# Patient Record
Sex: FEMALE | Race: WHITE | Employment: FULL TIME | URBAN - NONMETROPOLITAN AREA
[De-identification: names, ages, dates, MRNs, and addresses within clinical notes are randomized per-mention and may not be internally consistent; named-entity substitution may affect disease eponyms.]

---

## 2021-02-04 ENCOUNTER — OFFICE VISIT (OUTPATIENT)
Dept: OBGYN CLINIC | Age: 51
End: 2021-02-04
Payer: COMMERCIAL

## 2021-02-04 VITALS
DIASTOLIC BLOOD PRESSURE: 74 MMHG | WEIGHT: 237.2 LBS | SYSTOLIC BLOOD PRESSURE: 124 MMHG | HEIGHT: 64 IN | BODY MASS INDEX: 40.49 KG/M2

## 2021-02-04 DIAGNOSIS — F32.81 PMDD (PREMENSTRUAL DYSPHORIC DISORDER): ICD-10-CM

## 2021-02-04 DIAGNOSIS — N94.6 DYSMENORRHEA: Primary | ICD-10-CM

## 2021-02-04 PROCEDURE — 99213 OFFICE O/P EST LOW 20 MIN: CPT | Performed by: ADVANCED PRACTICE MIDWIFE

## 2021-02-04 ASSESSMENT — ENCOUNTER SYMPTOMS
NAUSEA: 1
DIARRHEA: 1
RESPIRATORY NEGATIVE: 1

## 2021-02-04 NOTE — PROGRESS NOTES
PROBLEM VISIT     Date of service: 2021    Lilia Horn  Is a 52 y.o.  female    PT's PCP is: Radha Escobedo MD     : 1970                                          Review of Systems   Constitutional: Negative. HENT: Negative. Respiratory: Negative. Cardiovascular: Negative. Gastrointestinal: Positive for diarrhea (with menses) and nausea (with menses). Endocrine: Negative. Genitourinary: Positive for menstrual problem (very painful heavy cycles). Negative for dyspareunia. Musculoskeletal: Positive for myalgias (with menses ). Patient's last menstrual period was 2021 (exact date). OB History    Para Term  AB Living   2             SAB TAB Ectopic Molar Multiple Live Births             2      # Outcome Date GA Lbr Leodan/2nd Weight Sex Delivery Anes PTL Lv   2             1                  Social History     Tobacco Use   Smoking Status Never Smoker   Smokeless Tobacco Never Used        Social History     Substance and Sexual Activity   Alcohol Use Yes    Alcohol/week: 1.0 standard drinks    Types: 1 Glasses of wine per week       Allergies: Patient has no known allergies. No current outpatient medications on file. Social History     Substance and Sexual Activity   Sexual Activity Not Currently       Chief Complaint   Patient presents with    Pelvic Pain     Pt c/o bad pelvic pain during cycles that includes nausea/vominting, diarrhea. Physical Exam  Constitutional:       Appearance: Normal appearance. She is obese. Genitourinary:      Pelvic exam was performed with patient in the lithotomy position. Vulva, urethra, bladder, vagina, cervix, right adnexa and left adnexa normal.      Uterus is enlarged (mild enlargement). Uterus is anteverted. HENT:      Head: Normocephalic. Eyes:      Pupils: Pupils are equal, round, and reactive to light. Neck:      Musculoskeletal: Normal range of motion. Pulmonary:      Effort: Pulmonary effort is normal.   Abdominal:      Palpations: Abdomen is soft. Musculoskeletal: Normal range of motion. Neurological:      Mental Status: She is alert and oriented to person, place, and time. Skin:     General: Skin is warm and dry. Psychiatric:         Behavior: Behavior normal.   Vitals signs and nursing note reviewed. Chaperone present: Declined. HPI: Reports that menses are very painful in addition to pelvic pain has generalized body aches for few days of cycle. Also nausea and diarrhea. Reports menses are regular with first 3 days of bleeding being spotty then 2 days of heavy bleeding then tapers off. Returns to [facility] in June for wellcheck and [employer] bx in addition to PFT's                          Assessment and Plan          Diagnosis Orders   1. Dysmenorrhea     2. PMDD (premenstrual dysphoric disorder)       Discussed possible causes for menses changes including but not limited to thyroid dysfunction (denies all s/s of this), adenomyosis, fibroids. Patient agreeable to USN and then f/u after to discuss options. Briefly mentioned LARC IUD, ablation (spouse has had vasectomy so prefers no t/l as \"not getting pregnant ever again\"), hyst.  Patient leaning toward hyst.         Chandrakant Beard does not currently have medications on file. Return in about 1 week (around 2/11/2021) for Pelvic USN, Gyn f/u. She was also counseled on her preventative health maintenance recommendations and follow-up. There are no Patient Instructions on file for this visit.     [address] JANES,2/4/2021 11:53 AM

## 2021-02-08 ENCOUNTER — OFFICE VISIT (OUTPATIENT)
Dept: OBGYN CLINIC | Age: 51
End: 2021-02-08
Payer: COMMERCIAL

## 2021-02-08 ENCOUNTER — TELEPHONE (OUTPATIENT)
Dept: OBGYN CLINIC | Age: 51
End: 2021-02-08

## 2021-02-08 VITALS
DIASTOLIC BLOOD PRESSURE: 72 MMHG | WEIGHT: 240.8 LBS | BODY MASS INDEX: 41.11 KG/M2 | HEIGHT: 64 IN | SYSTOLIC BLOOD PRESSURE: 110 MMHG

## 2021-02-08 DIAGNOSIS — N85.2 ENLARGED UTERUS: ICD-10-CM

## 2021-02-08 DIAGNOSIS — N92.0 MENORRHAGIA WITH REGULAR CYCLE: Primary | ICD-10-CM

## 2021-02-08 PROCEDURE — 99213 OFFICE O/P EST LOW 20 MIN: CPT | Performed by: ADVANCED PRACTICE MIDWIFE

## 2021-02-08 ASSESSMENT — ENCOUNTER SYMPTOMS
NAUSEA: 1
RESPIRATORY NEGATIVE: 1
DIARRHEA: 1

## 2021-02-08 NOTE — PROGRESS NOTES
PROBLEM VISIT     Date of service: 2021    Jaylon Greenberg  Is a 52 y.o.  female    PT's PCP is: Ekta Razo MD     : 1970                                          Review of Systems   Constitutional: Negative. Respiratory: Negative. Cardiovascular: Negative. Gastrointestinal: Positive for diarrhea and nausea. Genitourinary: Positive for menstrual problem. No significant changes since last visit. S/S all reviewed last visit and occur during menses    Patient's last menstrual period was 2021 (exact date). OB History    Para Term  AB Living   2             SAB TAB Ectopic Molar Multiple Live Births             2      # Outcome Date GA Lbr Leodan/2nd Weight Sex Delivery Anes PTL Lv   2             1                  Social History     Tobacco Use   Smoking Status Never Smoker   Smokeless Tobacco Never Used        Social History     Substance and Sexual Activity   Alcohol Use Yes    Alcohol/week: 1.0 standard drinks    Types: 1 Glasses of wine per week       Allergies: Patient has no known allergies. No current outpatient medications on file. Social History     Substance and Sexual Activity   Sexual Activity Not Currently       Chief Complaint   Patient presents with    Dysmenorrhea     Pt here for USN f/u. Pt denies any new concerns. Physical Exam  Constitutional:       Appearance: Normal appearance. She is obese. HENT:      Head: Normocephalic. Eyes:      Pupils: Pupils are equal, round, and reactive to light. Neck:      Musculoskeletal: Normal range of motion. Pulmonary:      Effort: Pulmonary effort is normal.   Musculoskeletal: Normal range of motion. Neurological:      Mental Status: She is alert and oriented to person, place, and time. Skin:     General: Skin is warm and dry. Psychiatric:         Behavior: Behavior normal.   Vitals signs and nursing note reviewed.          Vital Signs Blood pressure 110/72, height 5' 4\" (1.626 m), weight 240 lb 12.8 oz (109.2 kg), last menstrual period 01/24/2021. HPI Patient here to review USN which was done for heavy painful menses - has other s/s during menses that are more systemic (GI) and also body aches. HX of anemia. Has umbilical hernia repair with mesh placement                      Results reviewed today:    USN today shows enlarged uterus (this was also palpated last exam) 10.6x5.8x5.0 cm  Endometrium = 11mm (currently cycle day 16)  Ovaries appear normal  Follicles bilaterally with largest 1.9cm                              Assessment and Plan          Diagnosis Orders   1. Menorrhagia with regular cycle     2. Enlarged uterus         Discussed findings with patient in detail. Discussed options for menses mgmt. Discussed that novasure recommends uterus <10cm for ablation. Discussed that IUD is not a guarantee for menses suppression and that often by 6 months women do have improvement in bleeding but cannot guarantee and may have irregular bleeding leading up to that time. Reviewed hyst options - patient concerned about hx of mesh at umbilicus and also anemia. Patient was leaning toward hyst but would like to review with Dr Ray Lane at annual exam again. [facility] Monroe does not currently have medications on file. Return Annual with Dr Ray Lane. She was also counseled on her preventative health maintenance recommendations and follow-up. There are no Patient Instructions on file for this visit.     Sheila MARRERO,2/8/2021 10:10 AM                   Electronically signed by NELA Cline CNM

## 2021-02-08 NOTE — TELEPHONE ENCOUNTER
Dr Jessica Godinez - wanted to let you know about this sweet patient. She cam in 2/4 to discuss her cycles. Still regular but having GI changes in addition to body aches with menses. Cycles are very heavy and painful. On exam I did note a slightly enlarged uterus. She was then scheduled for USN. We touched on options at this visit for mgmt - ablation, IUD, hyst and she was leaning toward hyst.    USN today shows 10.6cm uterus, endometrium of 11mm (currently mid cycle). We reviewed options again - discussed that novasure recommends uterine size less than or equal to 10mm. Discussed that cannot guarantee menses suppression with IUD and that may have irregular bleeding for first 4-6 months, also may not resolve pain if unrelated to menses. Discussed hyst - has umbilical mesh from hernia repair done by Dr Juan Burleson and long hx of anemia. She wanted to review hyst in detail more with you and concerns about mesh placement etc.  She was scheduled for annual with MT but moved appt to you to review this in detail again.       Thanks

## 2021-03-03 ENCOUNTER — HOSPITAL ENCOUNTER (OUTPATIENT)
Age: 51
Setting detail: SPECIMEN
Discharge: HOME | End: 2021-03-03
Payer: COMMERCIAL

## 2021-03-03 ENCOUNTER — OFFICE VISIT (OUTPATIENT)
Dept: OBGYN CLINIC | Age: 51
End: 2021-03-03
Payer: COMMERCIAL

## 2021-03-03 VITALS
DIASTOLIC BLOOD PRESSURE: 64 MMHG | SYSTOLIC BLOOD PRESSURE: 100 MMHG | HEIGHT: 63 IN | WEIGHT: 241 LBS | BODY MASS INDEX: 42.7 KG/M2

## 2021-03-03 DIAGNOSIS — N85.2 ENLARGED UTERUS: ICD-10-CM

## 2021-03-03 DIAGNOSIS — Z01.419 WOMEN'S ANNUAL ROUTINE GYNECOLOGICAL EXAMINATION: ICD-10-CM

## 2021-03-03 DIAGNOSIS — N92.0 MENORRHAGIA WITH REGULAR CYCLE: Primary | ICD-10-CM

## 2021-03-03 PROCEDURE — 99396 PREV VISIT EST AGE 40-64: CPT | Performed by: OBSTETRICS & GYNECOLOGY

## 2021-03-03 RX ORDER — FLUCONAZOLE 150 MG/1
150 TABLET ORAL
Qty: 2 TABLET | Refills: 2 | Status: SHIPPED | OUTPATIENT
Start: 2021-03-03 | End: 2021-03-09

## 2021-03-03 NOTE — PROGRESS NOTES
DATE OF VISIT:  3/3/21  PATIENT NAME:  Blanquita Weems     YOB: 1970    52 y.o. Chief Complaint   Patient presents with    Annual Exam     Pt. needs pap. Pt. is overdue for mamm.  Pelvic Pain     Pt. reports severe pain with her period. With her period in . she was in such severe pain that she was on the floor, sweating,  feeling as if she was going to vomit.  Menorrhagia     Pt. reports heavy bleeding on 3rd day of her period. Has to wear pad and a tampon. 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 Patient's last menstrual period was 2021. Primary Care Physician: Juan F Bustamante MD    The patient was seen and examined. She has no chiefcomplaint today and is here for her annual exam.  Her bowels are regular. There are no voiding complaints. She denies any bloating. She denies vaginal discharge and was counseled on STD's and the need for barriercontraception.      HPI : Blanquita Weems is a 52 y.o. female  who presents today for her annual.    ______________________________________________________________________    OB History    Para Term  AB Living   2 2           SAB TAB Ectopic Molar Multiple Live Births             2      # Outcome Date GA Lbr Leodan/2nd Weight Sex Delivery Anes PTL Lv   2 Para            1 Para              Past Medical History:   Diagnosis Date    Abnormal Pap smear of cervix     , 2016    Hyperlipidemia     Hypertension     Liver disease     Stage 3 cirrhosis of liver, spelenomegaly, hx sarcoidosis    Migraines     Polycystic ovaries                                                                    Past Surgical History:   Procedure Laterality Date    CHOLECYSTECTOMY  2010    HERNIA REPAIR      2019    LIVER BIOPSY  2016     Family History   Problem Relation Age of Onset    Lung Cancer Paternal Grandfather     Diabetes Paternal Grandfather     Diabetes Paternal Grandmother     Hypertension Father      Social History     Socioeconomic History    Marital status:      Spouse name: Not on file    Number of children: Not on file    Years of education: Not on file    Highest education level: Not on file   Occupational History    Not on file   Social Needs    Financial resource strain: Not on file    Food insecurity     Worry: Not on file     Inability: Not on file    Transportation needs     Medical: Not on file     Non-medical: Not on file   Tobacco Use    Smoking status: Never Smoker    Smokeless tobacco: Never Used   Substance and Sexual Activity    Alcohol use: Yes     Alcohol/week: 1.0 standard drinks     Types: 1 Glasses of wine per week    Drug use: Never    Sexual activity: Not Currently   Lifestyle    Physical activity     Days per week: Not on file     Minutes per session: Not on file    Stress: Not on file   Relationships    Social connections     Talks on phone: Not on file     Gets together: Not on file     Attends [employer] service: Not on file     Active member of club or organization: Not on file     Attends meetings of clubs or organizations: Not on file     Relationship status: Not on file    Intimate partner violence     Fear of current or ex partner: Not on file     Emotionally abused: Not on file     Physically abused: Not on file     Forced sexual activity: Not on file   Other Topics Concern    Not on file   Social History Narrative    Not on file     Vitals:    03/03/21 0753   BP: 100/64   Site: Right Upper Arm   Position: Sitting   Weight: 241 lb (109.3 kg)   Height: 5' 3\" (1.6 m)     Body mass index is 42.69 kg/m². Patient's last menstrual period was 02/21/2021. MEDICATIONS:  Current Outpatient Medications   Medication Sig Dispense Refill    fluconazole (DIFLUCAN) 150 MG tablet Take 1 tablet by mouth every 72 hours for 6 days 2 tablet 2     No current facility-administered medications for this visit.         ALLERGIES: Allergies as of 03/03/2021    (No Known Allergies)           Symptoms of decreased mood absent    **If either question is answered in a  positive fashion then completethe PHQ9 Scoring Evaluation and make the appropriate referral**      Gynecologic History:     Patient's last menstrual period was 02/21/2021. Sexually Active: yes    STD History: No     Permanent Sterilization:No   Reversible Birth Control: No        Hormone Replacement Exposure: No      Genetic Qualified Family History of Breast, Ovarian , Colon or Uterine Cancer:No   If YES see scanned worksheet. Preventative Health Testing:  Colposcopy History:   Date of Last Mammogram: due - will do at [address]  Date of Last Colonoscopy:   Date of Last Bone Density:      ______________________________________________________________________    REVIEW OF SYSTEMS:       Review of Systems   Constitutional: Negative for chills and fever. Genitourinary: Positive for menstrual problem and pelvic pain. Negative for dysuria and vaginal discharge. PHYSICAL EXAM:    Physical Exam  Constitutional:       Appearance: Normal appearance. Genitourinary:      Pelvic exam was performed with patient in the lithotomy position. Vulva, vagina, cervix, uterus, right adnexa and left adnexa normal.   HENT:      Head: Atraumatic. Mouth/Throat:      Mouth: Mucous membranes are moist.   Eyes:      Extraocular Movements: Extraocular movements intact. Pupils: Pupils are equal, round, and reactive to light. Neck:      Musculoskeletal: Normal range of motion. Cardiovascular:      Rate and Rhythm: Normal rate. Pulmonary:      Effort: Pulmonary effort is normal.   Chest:      Breasts:         Right: Normal.         Left: Normal.   Abdominal:      General: There is no distension. Palpations: Abdomen is soft. Tenderness: There is no abdominal tenderness. Musculoskeletal: Normal range of motion.    Neurological:      Mental Status: She is alert and oriented to person, place, and time. Skin:     General: Skin is warm and dry. Psychiatric:         Mood and Affect: Mood normal.         Behavior: Behavior normal.         Thought Content: Thought content normal.         Judgment: Judgment normal.   Chaperone present: chaperone declined. POC Cultures:  No results found for this visit on 03/03/21. ASSESSMENT:      52 y.o. Annual  1. Menorrhagia with regular cycle    2. Enlarged uterus    3. Women's annual routine gynecological examination      Pt to consider treatment options for menorrhagia - considering hysteroscopy d&c novasure v tlh    There are no active problems to display for this patient. Hereditary Breast, Ovarian, Colon and Uterine Cancer screening Done. Tobacco & Secondary smoke risks reviewed; instructed on cessation and avoidance    PLAN:    Return in about 1 year (around 3/3/2022) for annual.  Orders Placed This Encounter   Procedures    PAP SMEAR       Repeat Annual every 1 year  Cervical Cytology Evaluation begins at 24years old. If Negative Cytology, Follow-up screening per current guidelines. Mammograms every 1year. If 37 yo and last mammogram was negative. Calcium and Vitamin D dosing reviewed. Colonoscopy screening reviewed as well as onset for bone density testing. Birth control and barrier recommendationsdiscussed. STD counseling and prevention reviewed. Gardisil counseling completed for all patients 7-33 yo. Routine healthmaintenance per patients PCP.     Electronicallysigned by:  Anthony Madden DO on 03/03/21

## 2021-03-05 LAB
HPV SAMPLE: NORMAL
HPV, GENOTYPE 16: NOT DETECTED
HPV, GENOTYPE 18: NOT DETECTED
HPV, HIGH RISK OTHER: NOT DETECTED
HPV, INTERPRETATION: NORMAL
SPECIMEN DESCRIPTION: NORMAL

## 2021-03-11 LAB — CYTOLOGY REPORT: NORMAL

## 2021-03-15 RX ORDER — FLUCONAZOLE 150 MG/1
150 TABLET ORAL ONCE
Qty: 1 TABLET | Refills: 0 | Status: SHIPPED | OUTPATIENT
Start: 2021-03-15 | End: 2021-03-15

## 2021-03-15 NOTE — RESULT ENCOUNTER NOTE
Left a message on patient's voicemail of her results and recommendations. Rx for Diflucan e-prescribed. She is to call back with any further questions/concerns.

## 2021-06-02 ENCOUNTER — TELEPHONE (OUTPATIENT)
Dept: OBGYN CLINIC | Age: 51
End: 2021-06-02

## 2021-06-02 NOTE — TELEPHONE ENCOUNTER
Patient had left a message on the voicemail that she wanted to schedule a hyst.  Attempted to call patient to schedule her procedure and had to leave a message. Left details regarding surgery expectations and recovery. Left available dates also. She will look at her calendar and call back with her decision.

## 2021-07-07 DIAGNOSIS — I10 ESSENTIAL HYPERTENSION, BENIGN: Primary | ICD-10-CM

## 2021-07-07 DIAGNOSIS — N94.6 DYSMENORRHEA: ICD-10-CM

## 2021-07-07 DIAGNOSIS — N92.0 MENORRHAGIA WITH REGULAR CYCLE: ICD-10-CM

## 2021-07-07 DIAGNOSIS — Z01.818 PRE-OP TESTING: ICD-10-CM

## 2021-07-07 DIAGNOSIS — N85.2 ENLARGED UTERUS: ICD-10-CM

## 2021-07-07 DIAGNOSIS — R10.2 PELVIC PAIN IN FEMALE: ICD-10-CM

## 2021-07-07 NOTE — TELEPHONE ENCOUNTER
Patient had left a message on the voicemail that she would like to go ahead with 11/15. I attempted to call patient to confirm surgery details and had to leave a message. She is scheduled for a [employer], shady HENDERSON, shady Mendes at [facility] on 11/15. She is aware that a nurse from [facility] will call her to complete a phone interview and arrange COVID testing if needed. She will let me know if she needs a note for work. Patient will come in to see Dr. Ivette Looney prior to surgery and will get labs at that visit. We will also follow up 4 weeks after surgery. She is instructed to call back to schedule her visit. Patient to call with any further questions/concerns.

## 2021-10-25 NOTE — PROGRESS NOTES
Requested specialist office notes and imaging reports from Dr. Clint Kumar office per anesthesia request.

## 2021-10-27 RX ORDER — FLUCONAZOLE 150 MG/1
150 TABLET ORAL ONCE
Qty: 1 TABLET | Refills: 0 | Status: SHIPPED | OUTPATIENT
Start: 2021-10-27 | End: 2021-10-27

## 2021-10-27 NOTE — TELEPHONE ENCOUNTER
Patient called and reports a \"raging\" yeast infection. Reports itching and white discharge. Dr. Ashely Davey has given her Diflucan to take in the past  And she is out. Pt. Requesting Diflucan rx. She is scheduled on 11-15 for surgery. Patient also asking about steriods she is on from [facility]. Jess Parham is contacting [facility] and this and will get back to the patient with surgery instructions.

## 2021-11-08 RX ORDER — PREDNISONE 20 MG/1
30 TABLET ORAL DAILY
COMMUNITY
End: 2022-06-29

## 2021-11-10 ENCOUNTER — OFFICE VISIT (OUTPATIENT)
Dept: OBGYN CLINIC | Age: 51
End: 2021-11-10
Payer: COMMERCIAL

## 2021-11-10 VITALS
DIASTOLIC BLOOD PRESSURE: 75 MMHG | WEIGHT: 236 LBS | BODY MASS INDEX: 41.81 KG/M2 | SYSTOLIC BLOOD PRESSURE: 120 MMHG | HEART RATE: 81 BPM

## 2021-11-10 DIAGNOSIS — N94.6 DYSMENORRHEA: ICD-10-CM

## 2021-11-10 DIAGNOSIS — N92.0 MENORRHAGIA WITH REGULAR CYCLE: ICD-10-CM

## 2021-11-10 DIAGNOSIS — N85.2 ENLARGED UTERUS: ICD-10-CM

## 2021-11-10 DIAGNOSIS — R10.2 PELVIC PAIN: Primary | ICD-10-CM

## 2021-11-10 PROCEDURE — 99213 OFFICE O/P EST LOW 20 MIN: CPT | Performed by: OBSTETRICS & GYNECOLOGY

## 2021-11-10 NOTE — PROGRESS NOTES
DATE OF VISIT:  11/10/21    PATIENT NAME:  Owen Dean     YOB: 1970    REASON FOR VISIT:    Chief Complaint   Patient presents with    Pre-op Exam     Pt. is scheduled on 11- for RA TLH, poss BSO, poss lap colpo. Pt. currently on 30mg of prednisone. Next week will be on 20 mg. She will then be done with the prednisone. 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  Menorrhagia     cycle day 3 of her period bleeding is heavy enough that she has to wear a pad and a tampon. 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  Pelvic Pain     severe pain with period. With period in [place] she had pain so severe it caused her to be on the floor, sweating, feeling as if she could vomit. She also gets diarrhea with a period. HISTORY OF PRESENT ILLNESS:  Pt with heavy menses and pelvic pain; usn showed enlarged uterus:Enlarged uterus measures: 10.6 x 5.8  x 5.0 cm adenomyotic in appearance; Endometrium measures: 11.0 mm; Ovaries appear WNL  disc'd tlh/bso; disc'd ovarian conservation v oophorectomy; r/b/a reviewed; recovery and restrictions reviewed          Patient's last menstrual period was 11/10/2021 (exact date). Vitals:    11/10/21 1532 11/10/21 1539   BP: (!) 138/90 120/75   Site: Right Upper Arm Left Upper Arm   Position: Sitting Sitting   Pulse: 81    Weight: 236 lb (107 kg)      Body mass index is 41.81 kg/m². No Known Allergies  Current Outpatient Medications   Medication Sig Dispense Refill    predniSONE (DELTASONE) 20 MG tablet Take 30 mg by mouth daily For week of 11/7/2021 and will continue to taper dosage, will be taking 20mg daily week of 11/14/2021. No current facility-administered medications for this visit.      Social History     Socioeconomic History    Marital status:      Spouse name: Not on file    Number of children: Not on file    Years of education: Not on file    Highest education level: Not on file   Occupational History    Not on file   Tobacco Use    Smoking status: Never Smoker    Smokeless tobacco: Never Used   Vaping Use    Vaping Use: Never used   Substance and Sexual Activity    Alcohol use: Yes     Alcohol/week: 1.0 standard drink     Types: 1 Glasses of wine per week    Drug use: Never    Sexual activity: Not Currently   Other Topics Concern    Not on file   Social History Narrative    Not on file     Social Determinants of Health     Financial Resource Strain:     Difficulty of Paying Living Expenses: Not on file   Food Insecurity:     Worried About Running Out of Food in the Last Year: Not on file    [place] of Food in the Last Year: Not on file   Transportation Needs:     Lack of Transportation (Medical): Not on file    Lack of Transportation (Non-Medical): Not on file   Physical Activity:     Days of Exercise per Week: Not on file    Minutes of Exercise per Session: Not on file   Stress:     Feeling of Stress : Not on file   Social Connections:     Frequency of Communication with Friends and Family: Not on file    Frequency of Social Gatherings with Friends and Family: Not on file    Attends [employer] Services: Not on file    Active Member of [address] [place] [address] or Organizations: Not on file    Attends Club or Organization Meetings: Not on file    Marital Status: Not on file   Intimate Partner Violence:     Fear of Current or Ex-Partner: Not on file    Emotionally Abused: Not on file    Physically Abused: Not on file    Sexually Abused: Not on file   Housing Stability:     Unable to Pay for Housing in the Last Year: Not on file    Number of [place] in the Last Year: Not on file    Unstable Housing in the Last Year: Not on file       REVIEW OF SYSTEMS:  Review of Systems   Constitutional: Negative for chills. Genitourinary: Positive for menstrual problem and pelvic pain. Negative for dysuria and vaginal discharge.        PHYSICAL EXAM:  /75 (Site: Left Upper Arm, Position: Sitting)   Pulse 81   Wt 236 lb (107 kg)   LMP 11/10/2021 (Exact Date)   BMI 41.81 kg/m²   Physical Exam  Constitutional:       Appearance: Normal appearance. HENT:      Head: Normocephalic and atraumatic. Eyes:      Extraocular Movements: Extraocular movements intact. Pupils: Pupils are equal, round, and reactive to light. Cardiovascular:      Rate and Rhythm: Normal rate. Pulmonary:      Effort: Pulmonary effort is normal.   Musculoskeletal:         General: Normal range of motion. Cervical back: Normal range of motion. Neurological:      Mental Status: She is alert and oriented to person, place, and time. Skin:     General: Skin is warm and dry. Psychiatric:         Mood and Affect: Mood normal.         Behavior: Behavior normal.         Thought Content: Thought content normal.         Judgment: Judgment normal.       The patient, Jose Dhillon is a 54 y.o. female, was seen with a total time spent of 20 minutes for the visit on this date of service by the E/M provider. The time component had both face to face and non face to face time spent in determining the total time component. Counseling and education regarding her diagnosis listed below and her options regarding those diagnoses were also included in determining her time component. Diagnosis Orders   1. Pelvic pain     2. Dysmenorrhea     3. Menorrhagia with regular cycle     4. Enlarged uterus          The patient had her preventative health maintenance recommendations and follow-up reviewed with her at the completion of her visit. ASSESSMENT:    1. Pelvic pain    2. Dysmenorrhea    3. Menorrhagia with regular cycle    4. Enlarged uterus        PLAN:  No orders of the defined types were placed in this encounter. Return in about 1 week (around 11/17/2021) for RA TLH/poss BSO.        Electronically signed by Segun Tariq DO on 11/10/21

## 2021-11-12 ENCOUNTER — ANESTHESIA EVENT (OUTPATIENT)
Dept: OPERATING ROOM | Age: 51
End: 2021-11-12
Payer: COMMERCIAL

## 2021-11-15 ENCOUNTER — HOSPITAL ENCOUNTER (OUTPATIENT)
Age: 51
Setting detail: OUTPATIENT SURGERY
Discharge: HOME | End: 2021-11-15
Attending: OBSTETRICS & GYNECOLOGY | Admitting: OBSTETRICS & GYNECOLOGY
Payer: COMMERCIAL

## 2021-11-15 ENCOUNTER — ANESTHESIA (OUTPATIENT)
Dept: OPERATING ROOM | Age: 51
End: 2021-11-15
Payer: COMMERCIAL

## 2021-11-15 VITALS
SYSTOLIC BLOOD PRESSURE: 115 MMHG | OXYGEN SATURATION: 97 % | BODY MASS INDEX: 40.93 KG/M2 | HEIGHT: 63 IN | TEMPERATURE: 98.3 F | RESPIRATION RATE: 16 BRPM | DIASTOLIC BLOOD PRESSURE: 71 MMHG | WEIGHT: 231 LBS | HEART RATE: 88 BPM

## 2021-11-15 VITALS — OXYGEN SATURATION: 100 % | DIASTOLIC BLOOD PRESSURE: 144 MMHG | SYSTOLIC BLOOD PRESSURE: 158 MMHG

## 2021-11-15 DIAGNOSIS — G89.18 POST-OP PAIN: Primary | ICD-10-CM

## 2021-11-15 PROCEDURE — 64488 TAP BLOCK BI INJECTION: CPT | Performed by: NURSE ANESTHETIST, CERTIFIED REGISTERED

## 2021-11-15 PROCEDURE — 88307 TISSUE EXAM BY PATHOLOGIST: CPT

## 2021-11-15 PROCEDURE — 6360000002 HC RX W HCPCS: Performed by: NURSE ANESTHETIST, CERTIFIED REGISTERED

## 2021-11-15 PROCEDURE — 58571 TLH W/T/O 250 G OR LESS: CPT | Performed by: OBSTETRICS & GYNECOLOGY

## 2021-11-15 PROCEDURE — 6360000002 HC RX W HCPCS

## 2021-11-15 PROCEDURE — 7100000000 HC PACU RECOVERY - FIRST 15 MIN: Performed by: OBSTETRICS & GYNECOLOGY

## 2021-11-15 PROCEDURE — 58571 TLH W/T/O 250 G OR LESS: CPT

## 2021-11-15 PROCEDURE — 3600000019 HC SURGERY ROBOT ADDTL 15MIN: Performed by: OBSTETRICS & GYNECOLOGY

## 2021-11-15 PROCEDURE — 6370000000 HC RX 637 (ALT 250 FOR IP): Performed by: OBSTETRICS & GYNECOLOGY

## 2021-11-15 PROCEDURE — 2580000003 HC RX 258: Performed by: NURSE ANESTHETIST, CERTIFIED REGISTERED

## 2021-11-15 PROCEDURE — 6370000000 HC RX 637 (ALT 250 FOR IP): Performed by: NURSE ANESTHETIST, CERTIFIED REGISTERED

## 2021-11-15 PROCEDURE — S2900 ROBOTIC SURGICAL SYSTEM: HCPCS | Performed by: OBSTETRICS & GYNECOLOGY

## 2021-11-15 PROCEDURE — 2500000003 HC RX 250 WO HCPCS: Performed by: NURSE ANESTHETIST, CERTIFIED REGISTERED

## 2021-11-15 PROCEDURE — 3700000000 HC ANESTHESIA ATTENDED CARE: Performed by: OBSTETRICS & GYNECOLOGY

## 2021-11-15 PROCEDURE — 3700000001 HC ADD 15 MINUTES (ANESTHESIA): Performed by: OBSTETRICS & GYNECOLOGY

## 2021-11-15 PROCEDURE — 7100000001 HC PACU RECOVERY - ADDTL 15 MIN: Performed by: OBSTETRICS & GYNECOLOGY

## 2021-11-15 PROCEDURE — 7100000011 HC PHASE II RECOVERY - ADDTL 15 MIN: Performed by: OBSTETRICS & GYNECOLOGY

## 2021-11-15 PROCEDURE — 3600000009 HC SURGERY ROBOT BASE: Performed by: OBSTETRICS & GYNECOLOGY

## 2021-11-15 PROCEDURE — 7100000010 HC PHASE II RECOVERY - FIRST 15 MIN: Performed by: OBSTETRICS & GYNECOLOGY

## 2021-11-15 PROCEDURE — 2709999900 HC NON-CHARGEABLE SUPPLY: Performed by: OBSTETRICS & GYNECOLOGY

## 2021-11-15 RX ORDER — GLYCOPYRROLATE 1 MG/5 ML
SYRINGE (ML) INTRAVENOUS PRN
Status: DISCONTINUED | OUTPATIENT
Start: 2021-11-15 | End: 2021-11-15 | Stop reason: SDUPTHER

## 2021-11-15 RX ORDER — FENTANYL CITRATE 50 UG/ML
50 INJECTION, SOLUTION INTRAMUSCULAR; INTRAVENOUS EVERY 5 MIN PRN
Status: DISCONTINUED | OUTPATIENT
Start: 2021-11-15 | End: 2021-11-15 | Stop reason: HOSPADM

## 2021-11-15 RX ORDER — DEXAMETHASONE SODIUM PHOSPHATE 4 MG/ML
INJECTION, SOLUTION INTRA-ARTICULAR; INTRALESIONAL; INTRAMUSCULAR; INTRAVENOUS; SOFT TISSUE PRN
Status: DISCONTINUED | OUTPATIENT
Start: 2021-11-15 | End: 2021-11-15 | Stop reason: SDUPTHER

## 2021-11-15 RX ORDER — METOCLOPRAMIDE HYDROCHLORIDE 5 MG/ML
10 INJECTION INTRAMUSCULAR; INTRAVENOUS
Status: DISCONTINUED | OUTPATIENT
Start: 2021-11-15 | End: 2021-11-15 | Stop reason: HOSPADM

## 2021-11-15 RX ORDER — SODIUM CHLORIDE 0.9 % (FLUSH) 0.9 %
5-40 SYRINGE (ML) INJECTION EVERY 12 HOURS SCHEDULED
Status: DISCONTINUED | OUTPATIENT
Start: 2021-11-15 | End: 2021-11-15 | Stop reason: HOSPADM

## 2021-11-15 RX ORDER — SODIUM CHLORIDE 9 MG/ML
25 INJECTION, SOLUTION INTRAVENOUS PRN
Status: DISCONTINUED | OUTPATIENT
Start: 2021-11-15 | End: 2021-11-15 | Stop reason: HOSPADM

## 2021-11-15 RX ORDER — FENTANYL CITRATE 50 UG/ML
INJECTION, SOLUTION INTRAMUSCULAR; INTRAVENOUS PRN
Status: DISCONTINUED | OUTPATIENT
Start: 2021-11-15 | End: 2021-11-15 | Stop reason: SDUPTHER

## 2021-11-15 RX ORDER — GABAPENTIN 300 MG/1
300 CAPSULE ORAL ONCE
Status: COMPLETED | OUTPATIENT
Start: 2021-11-15 | End: 2021-11-15

## 2021-11-15 RX ORDER — ONDANSETRON 2 MG/ML
INJECTION INTRAMUSCULAR; INTRAVENOUS PRN
Status: DISCONTINUED | OUTPATIENT
Start: 2021-11-15 | End: 2021-11-15 | Stop reason: SDUPTHER

## 2021-11-15 RX ORDER — HYDROCODONE BITARTRATE AND ACETAMINOPHEN 5; 325 MG/1; MG/1
1 TABLET ORAL
Status: COMPLETED | OUTPATIENT
Start: 2021-11-15 | End: 2021-11-15

## 2021-11-15 RX ORDER — PROPOFOL 10 MG/ML
INJECTION, EMULSION INTRAVENOUS PRN
Status: DISCONTINUED | OUTPATIENT
Start: 2021-11-15 | End: 2021-11-15 | Stop reason: SDUPTHER

## 2021-11-15 RX ORDER — LIDOCAINE HYDROCHLORIDE 20 MG/ML
INJECTION, SOLUTION EPIDURAL; INFILTRATION; INTRACAUDAL; PERINEURAL PRN
Status: DISCONTINUED | OUTPATIENT
Start: 2021-11-15 | End: 2021-11-15 | Stop reason: SDUPTHER

## 2021-11-15 RX ORDER — OXYCODONE HYDROCHLORIDE 5 MG/1
5 TABLET ORAL
Status: DISCONTINUED | OUTPATIENT
Start: 2021-11-15 | End: 2021-11-15 | Stop reason: HOSPADM

## 2021-11-15 RX ORDER — SODIUM CHLORIDE 9 MG/ML
INJECTION INTRAVENOUS PRN
Status: DISCONTINUED | OUTPATIENT
Start: 2021-11-15 | End: 2021-11-15 | Stop reason: SDUPTHER

## 2021-11-15 RX ORDER — ROCURONIUM BROMIDE 10 MG/ML
INJECTION, SOLUTION INTRAVENOUS PRN
Status: DISCONTINUED | OUTPATIENT
Start: 2021-11-15 | End: 2021-11-15 | Stop reason: SDUPTHER

## 2021-11-15 RX ORDER — KETOROLAC TROMETHAMINE 10 MG/1
10 TABLET, FILM COATED ORAL EVERY 6 HOURS PRN
Qty: 20 TABLET | Refills: 0 | Status: SHIPPED | OUTPATIENT
Start: 2021-11-15 | End: 2022-06-29

## 2021-11-15 RX ORDER — DEXAMETHASONE SODIUM PHOSPHATE 10 MG/ML
INJECTION, SOLUTION INTRAMUSCULAR; INTRAVENOUS PRN
Status: DISCONTINUED | OUTPATIENT
Start: 2021-11-15 | End: 2021-11-15 | Stop reason: SDUPTHER

## 2021-11-15 RX ORDER — DIMENHYDRINATE 50 MG/1
50 TABLET ORAL ONCE
Status: COMPLETED | OUTPATIENT
Start: 2021-11-15 | End: 2021-11-15

## 2021-11-15 RX ORDER — ATROPA BELLADONNA AND OPIUM 16.2; 3 MG/1; MG/1
SUPPOSITORY RECTAL
Status: DISCONTINUED
Start: 2021-11-15 | End: 2021-11-15 | Stop reason: HOSPADM

## 2021-11-15 RX ORDER — HYDROCODONE BITARTRATE AND ACETAMINOPHEN 5; 325 MG/1; MG/1
1 TABLET ORAL EVERY 6 HOURS PRN
Qty: 20 TABLET | Refills: 0 | Status: SHIPPED | OUTPATIENT
Start: 2021-11-15 | End: 2021-11-20

## 2021-11-15 RX ORDER — ROPIVACAINE HYDROCHLORIDE 5 MG/ML
INJECTION, SOLUTION EPIDURAL; INFILTRATION; PERINEURAL PRN
Status: DISCONTINUED | OUTPATIENT
Start: 2021-11-15 | End: 2021-11-15 | Stop reason: SDUPTHER

## 2021-11-15 RX ORDER — HYDROCODONE BITARTRATE AND ACETAMINOPHEN 5; 325 MG/1; MG/1
2 TABLET ORAL
Status: COMPLETED | OUTPATIENT
Start: 2021-11-15 | End: 2021-11-15

## 2021-11-15 RX ORDER — SODIUM CHLORIDE 0.9 % (FLUSH) 0.9 %
5-40 SYRINGE (ML) INJECTION PRN
Status: DISCONTINUED | OUTPATIENT
Start: 2021-11-15 | End: 2021-11-15 | Stop reason: HOSPADM

## 2021-11-15 RX ORDER — FENTANYL CITRATE 50 UG/ML
25 INJECTION, SOLUTION INTRAMUSCULAR; INTRAVENOUS EVERY 5 MIN PRN
Status: DISCONTINUED | OUTPATIENT
Start: 2021-11-15 | End: 2021-11-15 | Stop reason: HOSPADM

## 2021-11-15 RX ORDER — MIDAZOLAM HYDROCHLORIDE 1 MG/ML
INJECTION INTRAMUSCULAR; INTRAVENOUS PRN
Status: DISCONTINUED | OUTPATIENT
Start: 2021-11-15 | End: 2021-11-15 | Stop reason: SDUPTHER

## 2021-11-15 RX ORDER — SODIUM CHLORIDE, SODIUM LACTATE, POTASSIUM CHLORIDE, CALCIUM CHLORIDE 600; 310; 30; 20 MG/100ML; MG/100ML; MG/100ML; MG/100ML
INJECTION, SOLUTION INTRAVENOUS CONTINUOUS
Status: DISCONTINUED | OUTPATIENT
Start: 2021-11-15 | End: 2021-11-15 | Stop reason: HOSPADM

## 2021-11-15 RX ORDER — ACETAMINOPHEN 325 MG/1
650 TABLET ORAL ONCE
Status: COMPLETED | OUTPATIENT
Start: 2021-11-15 | End: 2021-11-15

## 2021-11-15 RX ORDER — KETOROLAC TROMETHAMINE 30 MG/ML
INJECTION, SOLUTION INTRAMUSCULAR; INTRAVENOUS PRN
Status: DISCONTINUED | OUTPATIENT
Start: 2021-11-15 | End: 2021-11-15 | Stop reason: SDUPTHER

## 2021-11-15 RX ORDER — ONDANSETRON 2 MG/ML
4 INJECTION INTRAMUSCULAR; INTRAVENOUS
Status: DISCONTINUED | OUTPATIENT
Start: 2021-11-15 | End: 2021-11-15 | Stop reason: HOSPADM

## 2021-11-15 RX ORDER — NEOSTIGMINE METHYLSULFATE 1 MG/ML
INJECTION, SOLUTION INTRAVENOUS PRN
Status: DISCONTINUED | OUTPATIENT
Start: 2021-11-15 | End: 2021-11-15 | Stop reason: SDUPTHER

## 2021-11-15 RX ADMIN — FENTANYL CITRATE 50 MCG: 50 INJECTION INTRAMUSCULAR; INTRAVENOUS at 13:07

## 2021-11-15 RX ADMIN — ONDANSETRON 4 MG: 2 INJECTION INTRAMUSCULAR; INTRAVENOUS at 14:46

## 2021-11-15 RX ADMIN — DEXAMETHASONE SODIUM PHOSPHATE 4 MG: 4 INJECTION, SOLUTION INTRAMUSCULAR; INTRAVENOUS at 13:28

## 2021-11-15 RX ADMIN — ROPIVACAINE HYDROCHLORIDE 60 ML: 5 INJECTION, SOLUTION EPIDURAL; INFILTRATION; PERINEURAL at 13:12

## 2021-11-15 RX ADMIN — FENTANYL CITRATE 50 MCG: 50 INJECTION INTRAMUSCULAR; INTRAVENOUS at 13:02

## 2021-11-15 RX ADMIN — LIDOCAINE HYDROCHLORIDE 100 MG: 20 INJECTION, SOLUTION EPIDURAL; INFILTRATION; INTRACAUDAL; PERINEURAL at 13:28

## 2021-11-15 RX ADMIN — Medication 3 MG: at 14:48

## 2021-11-15 RX ADMIN — ROCURONIUM BROMIDE 10 MG: 10 INJECTION, SOLUTION INTRAVENOUS at 14:26

## 2021-11-15 RX ADMIN — DIMENHYDRINATE 50 MG: 50 TABLET ORAL at 12:16

## 2021-11-15 RX ADMIN — DEXAMETHASONE SODIUM PHOSPHATE 10 MG: 10 INJECTION, SOLUTION INTRAMUSCULAR; INTRAVENOUS at 13:12

## 2021-11-15 RX ADMIN — ACETAMINOPHEN 650 MG: 325 TABLET ORAL at 12:16

## 2021-11-15 RX ADMIN — KETOROLAC TROMETHAMINE 30 MG: 30 INJECTION, SOLUTION INTRAMUSCULAR at 14:46

## 2021-11-15 RX ADMIN — ROCURONIUM BROMIDE 50 MG: 10 INJECTION, SOLUTION INTRAVENOUS at 13:28

## 2021-11-15 RX ADMIN — ENOXAPARIN SODIUM 40 MG: 40 INJECTION SUBCUTANEOUS at 12:30

## 2021-11-15 RX ADMIN — SODIUM CHLORIDE 30 ML: 9 INJECTION, SOLUTION INTRAMUSCULAR; INTRAVENOUS; SUBCUTANEOUS at 13:12

## 2021-11-15 RX ADMIN — PROPOFOL 200 MG: 10 INJECTION, EMULSION INTRAVENOUS at 13:28

## 2021-11-15 RX ADMIN — MIDAZOLAM 2 MG: 1 INJECTION INTRAMUSCULAR; INTRAVENOUS at 13:02

## 2021-11-15 RX ADMIN — GABAPENTIN 300 MG: 300 CAPSULE ORAL at 12:16

## 2021-11-15 RX ADMIN — HYDROCODONE BITARTRATE AND ACETAMINOPHEN 2 TABLET: 5; 325 TABLET ORAL at 16:10

## 2021-11-15 RX ADMIN — SODIUM CHLORIDE, POTASSIUM CHLORIDE, SODIUM LACTATE AND CALCIUM CHLORIDE: 600; 310; 30; 20 INJECTION, SOLUTION INTRAVENOUS at 13:02

## 2021-11-15 RX ADMIN — Medication 0.6 MG: at 14:48

## 2021-11-15 ASSESSMENT — LIFESTYLE VARIABLES: SMOKING_STATUS: 0

## 2021-11-15 ASSESSMENT — PAIN SCALES - GENERAL
PAINLEVEL_OUTOF10: 0
PAINLEVEL_OUTOF10: 7
PAINLEVEL_OUTOF10: 7
PAINLEVEL_OUTOF10: 3

## 2021-11-15 NOTE — ANESTHESIA PRE PROCEDURE
Smoking status: Never Smoker    Smokeless tobacco: Never Used   Substance Use Topics    Alcohol use: Yes     Alcohol/week: 1.0 standard drink     Types: 1 Glasses of wine per week                                Counseling given: Not Answered      Vital Signs (Current):   Vitals:    11/08/21 1557 11/08/21 1558 11/15/21 1200 11/15/21 1215   BP:   (!) 187/101 (!) 167/95   Pulse:   96    Resp:   18    Temp:   36.5 °C (97.7 °F)    TempSrc:   Temporal    SpO2:   99%    Weight:  230 lb (104.3 kg) 231 lb (104.8 kg)    Height: 5' 3\" (1.6 m)  5' 3\" (1.6 m)                                               BP Readings from Last 3 Encounters:   11/15/21 (!) 167/95   11/10/21 120/75   03/03/21 100/64       NPO Status: Time of last liquid consumption: 2100                        Time of last solid consumption: 2000                        Date of last liquid consumption: 11/14/21                        Date of last solid food consumption: 11/14/21    BMI:   Wt Readings from Last 3 Encounters:   11/15/21 231 lb (104.8 kg)   11/10/21 236 lb (107 kg)   03/03/21 241 lb (109.3 kg)     Body mass index is 40.92 kg/m². CBC: No results found for: WBC, RBC, HGB, HCT, MCV, RDW, PLT    CMP: No results found for: NA, K, CL, CO2, BUN, CREATININE, GFRAA, AGRATIO, LABGLOM, GLUCOSE, PROT, CALCIUM, BILITOT, ALKPHOS, AST, ALT    POC Tests: No results for input(s): POCGLU, POCNA, POCK, POCCL, POCBUN, POCHEMO, POCHCT in the last 72 hours.     Coags: No results found for: PROTIME, INR, APTT    HCG (If Applicable): No results found for: PREGTESTUR, PREGSERUM, HCG, HCGQUANT     ABGs: No results found for: PHART, PO2ART, CXZ5IDI, CTD0CQH, BEART, R0LFOEYV     Type & Screen (If Applicable):  No results found for: LABABO, LABRH    Drug/Infectious Status (If Applicable):  No results found for: HIV, HEPCAB    COVID-19 Screening (If Applicable): No results found for: COVID19        Anesthesia Evaluation  Patient summary reviewed and Nursing notes reviewed no history of anesthetic complications:   Airway: Mallampati: II  TM distance: >3 FB   Neck ROM: full  Mouth opening: > = 3 FB Dental: normal exam         Pulmonary:Negative Pulmonary ROS and normal exam        (-) not a current smoker                           Cardiovascular:  Exercise tolerance: good (>4 METS),                     Neuro/Psych:   (+) headaches:,              ROS comment: Sarcoidosis- behind right eye and optic nerve- on prednisone- did not take today GI/Hepatic/Renal:        (-) GERD       Endo/Other: Negative Endo/Other ROS                    Abdominal:   (+) obese,           Vascular: Other Findings:             Anesthesia Plan      general     ASA 2     (Agree to PNB)      MIPS: Postoperative opioids intended and Prophylactic antiemetics administered. Anesthetic plan and risks discussed with patient and spouse. Plan discussed with CRNA.                   NELA Matamoros - CRNA   11/15/2021

## 2021-11-15 NOTE — H&P
HISTORY AND PHYSICAL             Date: 11/15/2021        Patient Name: Shon Moreno     YOB: 1970      Age:  54 y.o. Chief Complaint   No chief complaint on file. History Obtained From   patient    History of Present Illness   Pt with heavy menses and pelvic pain; usn showed enlarged uterus:Enlarged uterus measures: 10.6 x 5.8  x 5.0 cm adenomyotic in appearance    Past Medical History     Past Medical History:   Diagnosis Date    Abnormal Pap smear of cervix     2013, 2016    Hyperlipidemia     Hypertension     Liver disease     Stage 3 cirrhosis of liver, spelenomegaly, hx sarcoidosis    Migraines     Polycystic ovaries     Sarcoidosis         Past Surgical History     Past Surgical History:   Procedure Laterality Date    CHOLECYSTECTOMY  2010    HERNIA REPAIR  2019    w/ mesh implant    LIVER BIOPSY  2016        Medications Prior to Admission     Prior to Admission medications    Medication Sig Start Date End Date Taking? Authorizing Provider   predniSONE (DELTASONE) 20 MG tablet Take 30 mg by mouth daily For week of 11/7/2021 and will continue to taper dosage, will be taking 20mg daily week of 11/14/2021. Yes Historical Provider, MD        Allergies   Patient has no known allergies. Social History     Social History     Tobacco History     Smoking Status  Never Smoker    Smokeless Tobacco Use  Never Used          Alcohol History     Alcohol Use Status  Yes Drinks/Week  1 Glasses of wine per week Amount  1.0 standard drink of alcohol/wk          Drug Use     Drug Use Status  Never          Sexual Activity     Sexually Active  Not Currently                Family History     Family History   Problem Relation Age of Onset    Lung Cancer Paternal Grandfather     Diabetes Paternal Grandfather     Diabetes Paternal Grandmother     Hypertension Father        Review of Systems   Review of Systems   Constitutional: Negative for chills and fever.    Genitourinary: Positive for menstrual problem and pelvic pain. Physical Exam   BP (!) 167/95   Pulse 96   Temp 97.7 °F (36.5 °C) (Temporal)   Resp 18   Ht 5' 3\" (1.6 m)   Wt 231 lb (104.8 kg)   LMP 10/11/2021   SpO2 99%   BMI 40.92 kg/m²     Physical Exam  Constitutional:       Appearance: Normal appearance. HENT:      Head: Normocephalic and atraumatic. Eyes:      Extraocular Movements: Extraocular movements intact. Pupils: Pupils are equal, round, and reactive to light. Cardiovascular:      Rate and Rhythm: Normal rate. Pulmonary:      Effort: Pulmonary effort is normal.   Musculoskeletal:         General: Normal range of motion. Cervical back: Normal range of motion. Skin:     General: Skin is warm and dry. Neurological:      Mental Status: She is alert and oriented to person, place, and time. Psychiatric:         Mood and Affect: Mood normal.         Behavior: Behavior normal.         Thought Content: Thought content normal.         Judgment: Judgment normal.         Labs    No results found for this or any previous visit (from the past 24 hour(s)). Imaging/Diagnostics Last 24 Hours   No results found.     Assessment      Menorrhagia  Pelvic pain  Plan   1. RA TLH/shady BSO    Consultations Ordered:  None    Electronically signed by Ann Wayne DO on 11/15/21 at 12:30 PM EST

## 2021-11-15 NOTE — ANESTHESIA PROCEDURE NOTES
Peripheral Block    Patient location during procedure: pre-op  Start time: 11/15/2021 1:02 PM  End time: 11/15/2021 1:12 PM  Staffing  Performed: resident/CRNA   Resident/CRNA: NELA Matamoros CRNA  Preanesthetic Checklist  Completed: patient identified, IV checked, site marked, risks and benefits discussed, surgical consent, monitors and equipment checked, pre-op evaluation, timeout performed, anesthesia consent given, oxygen available and patient being monitored  Peripheral Block  Patient position: supine  Prep: ChloraPrep  Patient monitoring: continuous pulse ox, frequent blood pressure checks and IV access  Block type: TAP and Rectus sheath  Laterality: bilateral  Injection technique: single-shot  Guidance: ultrasound guided  Local infiltration: ropivacaine and decadron (20 ml ropivacaine 10 ml PFNS bilat TAP 10 ml ropivacaine 5 ml PFNS bilat RS)  Infiltration strength: 0.5 %  Dose: 60 mL  Provider prep: mask and sterile gloves  Local infiltration: ropivacaine and decadron (20 ml ropivacaine 10 ml PFNS bilat TAP 10 ml ropivacaine 5 ml PFNS bilat RS)  Needle  Needle type:  Other   Needle gauge: 22 G  Needle localization: anatomical landmarks and ultrasound guidanceOther needle type: Pajunk  Assessment  Injection assessment: negative aspiration for heme and no paresthesia on injection  Paresthesia pain: none  Slow fractionated injection: yes  Hemodynamics: stable  Reason for block: post-op pain management and at surgeon's request

## 2021-11-15 NOTE — OP NOTE
Preoperative diagnosis: 1. Menorrhagia  2. Pelvic pain   3. Dysmenorrhea  4. Adenomyosis    Postoperative diagnosis: Same    Procedure:  Robotic-assisted Total Laparoscopic Hysterectomy with Bilateral Salpingectomy    Surgeon: Dr. Mitch Delgado    Asst.: Ekta    Anesthesia: Gen. Estimated blood loss: 25 mL    Fluids: LR    Urine: 100 mL of clear urine    Condition: Stable    Complications: None    Findings: The patient had an anteverted uterus which sounded to 10 cm in depth. The tubes and ovaries were grossly within normal limits. Bilateral ureteral peristalsis was noted throughout the case and after closure of the vaginal cuff. Specimen removed: Uterus and Bilateral tubes    Operative note: The patient was taken to the operating room where general anesthesia was obtained without difficulty. She was prepped and draped usual sterile fashion in a dorsal lithotomy position. Timeout was performed. A weighted speculum was placed in the patient's vagina and the anterior lip of the cervix was grasped with a single-tooth tenaculum. The cervix was progressively dilated and the uterus was sounded with the findings noted above. A V care uterine manipulator was then placed. A Alvarado catheter was placed and left to gravity drainage. At this point attention was turned to the patient's abdomen where a supraumbilical incision was made with a scalpel. An 8 mm skin incision was made. A veress needle was then carefully introduced at a 45° angle while tenting the abdominal wall. Intraabdominal placement was confirmed by use of water-filled syringe and drop in intra-abdominal pressure with insufflation of CO2. Pneumoperitoneum was obtained with 2.5 liters of CO2. An 8 mm robotic port was then placed without difficulty and intra-abdominal placement was confirmed by laparoscopy.   Second and third ports were then placed under direct visualization approximately 4 cm inferior and  5 cm lateral to the first. 8 mm robotic ports were placed in these locations as well. A fourth and final port was placed lateral to the right. We placed a robotic port in this location. At this point the patient was placed into steep Trendelenburg position. The robot was brought into position and docked. The right arm was loaded with the scissors with monopolar cautery. The left side was loaded with the fenestrated bipolar. Attention was then turned to the console portion of the surgery. The left tube was tented and the mesosalpinx of the broad ligament was serially ligated and transected with the fenestrated bipolar toward the cornua of the uterus. The ovarian pedicle was then ligated with the fenestrated bipolar and transected with the scissors. Dissection continued along the broad ligament until the round ligament was ligated and transected. The anterior uterine serosa was then undermined and taken down to free the bladder from the lower uterine segment and cervix. This was done working from the left side to the midline. Attention was then turned to the right cornual region of the uterus and in a similar manner the tube and ovary were ligated and transected. Dissection again continued along the broad ligament until the round ligament was ligated and transected. The remainder of the bladder flap was then taken down. Once the bladder was freed from the lower uterine segment and cervix, the uterine arteries were skeletonized, ligated, and transected bilaterally. Using the scissors, the uterus was amputated just beneath the cervix using the groove of the V care as a guide. The uterus was then withdrawn through the vagina and a sponge stick was placed to maintain pneumoperitoneum. The vaginal cuff was then closed with V lock suture working from right to left using 2-0 suture. Copious amounts of irrigation were then used to evaluate the cuff and pedicles to assure hemostasis.   Joe Dines was placed over the vaginal

## 2021-11-15 NOTE — PROGRESS NOTES
Patient up to bathroom at this time. Small amount of bloody drainage noted on pad. New pad provided. Denies any issues with urination. Ambulated back to chair.

## 2021-11-16 NOTE — ANESTHESIA POSTPROCEDURE EVALUATION
Department of Anesthesiology  Postprocedure Note    Patient: Laurence Lu  MRN: [id number]  YOB: 1970  Date of evaluation: 11/15/2021  Time:  7:10 PM     Procedure Summary     Date: 11/15/21 Room / Location: [address] [place] [address]    Anesthesia Start: 3430 Anesthesia Stop: 0512    Procedure: HYSTERECTOMY VAGINAL LAPAROSCOPIC ROBOTIC ASSISTED, B/L salpingectomy (N/A ) Diagnosis: (MENORRHAGIA, DYSMENORRHEA)    Surgeons: Rogelio Rosario DO Responsible Provider: NELA Alvarez CRNA    Anesthesia Type: general ASA Status: 2          Anesthesia Type: general    Shabana Phase I: Shabana Score: 10    Shabana Phase II: Shabana Score: 10    Last vitals: Reviewed and per EMR flowsheets.        Anesthesia Post Evaluation    Patient location during evaluation: PACU  Patient participation: complete - patient participated  Level of consciousness: awake and alert  Pain score: 2  Airway patency: patent  Nausea & Vomiting: no nausea and no vomiting  Complications: no  Cardiovascular status: hemodynamically stable  Respiratory status: acceptable  Hydration status: euvolemic

## 2021-11-17 LAB — SURGICAL PATHOLOGY REPORT: NORMAL

## 2021-11-24 ENCOUNTER — OFFICE VISIT (OUTPATIENT)
Dept: OBGYN | Age: 51
End: 2021-11-24
Payer: COMMERCIAL

## 2021-11-24 ENCOUNTER — HOSPITAL ENCOUNTER (OUTPATIENT)
Age: 51
Setting detail: SPECIMEN
Discharge: HOME | End: 2021-11-24
Payer: COMMERCIAL

## 2021-11-24 ENCOUNTER — TELEPHONE (OUTPATIENT)
Dept: OBGYN CLINIC | Age: 51
End: 2021-11-24

## 2021-11-24 VITALS
HEIGHT: 63 IN | DIASTOLIC BLOOD PRESSURE: 76 MMHG | SYSTOLIC BLOOD PRESSURE: 124 MMHG | BODY MASS INDEX: 41.64 KG/M2 | WEIGHT: 235 LBS

## 2021-11-24 DIAGNOSIS — T49.95XA: ICD-10-CM

## 2021-11-24 DIAGNOSIS — B37.31 VAGINAL CANDIDIASIS: Primary | ICD-10-CM

## 2021-11-24 PROCEDURE — 99213 OFFICE O/P EST LOW 20 MIN: CPT | Performed by: ADVANCED PRACTICE MIDWIFE

## 2021-11-24 PROCEDURE — 87205 SMEAR GRAM STAIN: CPT

## 2021-11-24 PROCEDURE — 87070 CULTURE OTHR SPECIMN AEROBIC: CPT

## 2021-11-24 RX ORDER — FLUCONAZOLE 150 MG/1
150 TABLET ORAL DAILY PRN
Qty: 2 TABLET | Refills: 0 | Status: SHIPPED | OUTPATIENT
Start: 2021-11-24 | End: 2022-06-29

## 2021-11-24 RX ORDER — AMOXICILLIN AND CLAVULANATE POTASSIUM 875; 125 MG/1; MG/1
1 TABLET, FILM COATED ORAL 2 TIMES DAILY
Qty: 14 TABLET | Refills: 0 | Status: SHIPPED | OUTPATIENT
Start: 2021-11-24 | End: 2021-12-01

## 2021-11-24 NOTE — PROGRESS NOTES
PROBLEM VISIT     Date of service: 2021    Jose Dhillon  Is a 54 y.o.  female    PT's PCP is: Lauro Avila MD     : 1970                                             Subjective:       Patient's last menstrual period was 11/10/2021 (exact date). OB History    Para Term  AB Living   2 2           SAB IAB Ectopic Molar Multiple Live Births             2      # Outcome Date GA Lbr Leodan/2nd Weight Sex Delivery Anes PTL Lv   2 Para            1 Para                 Social History     Tobacco Use   Smoking Status Never Smoker   Smokeless Tobacco Never Used        Social History     Substance and Sexual Activity   Alcohol Use Yes    Alcohol/week: 1.0 standard drink    Types: 1 Glasses of wine per week    Comment: occasional       Allergies: Patient has no known allergies. Current Outpatient Medications:     ketorolac (TORADOL) 10 MG tablet, Take 1 tablet by mouth every 6 hours as needed for Pain (Patient not taking: Reported on 2021), Disp: 20 tablet, Rfl: 0    predniSONE (DELTASONE) 20 MG tablet, Take 30 mg by mouth daily For week of 2021 and will continue to taper dosage, will be taking 20mg daily week of 2021. (Patient not taking: Reported on 2021), Disp: , Rfl:     Social History     Substance and Sexual Activity   Sexual Activity Not Currently       Last Yearly:      Last pap:     Last HPV:     Have you had a positive covid test: No    Have you had the covid immunization: Yes    Chief Complaint   Patient presents with    Post-Op Check     Patient had [facility] 11/15/2021 Incisions appear red and slight watery discharge. PE:  Vital Signs  Blood pressure 124/76, height 5' 3\" (1.6 m), weight 235 lb (106.6 kg), last menstrual period 11/10/2021, not currently breastfeeding. Estimated body mass index is 41.63 kg/m² as calculated from the following:    Height as of this encounter: 5' 3\" (1.6 m).     Weight as of this encounter: 235 lb (106.6 kg). No data recorded    NURSE: Tanika HERNANDEZ    HPI: patient had robotic hysterectomy, had to take steri strips off and when she did water \"poured out\" of several of her incision sites, noted redness encircling each, otherwise doing well and is up and has been to the grocery store     PT denies fever, chills, nausea and vomiting       Objective   No acute distress  Excellent communications  Well-nourished        GI: Abdomen soft, non-tender. BS normal. No masses,  No organomegaly each abdominal robotic instrument incision site had 4 cm [place] of red parchment type appearance skin with one mid upper abdomen and second one in RLQ with water filled blisters x2, superficial culture taken from each, none appear to be currently draining and each site appears to be well approximated          Extremities: normal strength, tone, and muscle mass                           Results reviewed today:    No results found for this visit on 11/24/21. Assessment and Plan          Diagnosis Orders   1. Vaginal candidiasis  fluconazole (DIFLUCAN) 150 MG tablet   2. Adverse effect of skin agent, initial encounter  amoxicillin-clavulanate (AUGMENTIN) 875-125 MG per tablet    Culture, Wound       suspect a reaction to skin adhesive used under steri strips, will cover with antibiotic and await culture, patient sensitive to getting yeast infections when on antibiotics so will prophy with diflucan      I am having Sukumar Score start on amoxicillin-clavulanate and fluconazole. I am also having her maintain her predniSONE and ketorolac. Return keep appt with Dr. Patricia Patel Tuesday. There are no Patient Instructions on file for this visit. Over 50% of time spent on counseling and care coordination on: see assessment and plan,  She was also counseled on her preventative health maintenance recommendations and follow-up.         FF time: 20 min      NELA Maldonado CNM,11/24/2021 4:23 PM

## 2021-11-26 LAB
CULTURE: ABNORMAL
DIRECT EXAM: ABNORMAL
DIRECT EXAM: ABNORMAL
Lab: ABNORMAL
SPECIMEN DESCRIPTION: ABNORMAL

## 2021-11-30 ENCOUNTER — OFFICE VISIT (OUTPATIENT)
Dept: OBGYN CLINIC | Age: 51
End: 2021-11-30

## 2021-11-30 VITALS — DIASTOLIC BLOOD PRESSURE: 90 MMHG | HEART RATE: 80 BPM | SYSTOLIC BLOOD PRESSURE: 155 MMHG

## 2021-11-30 DIAGNOSIS — Z09 POSTOPERATIVE EXAMINATION: Primary | ICD-10-CM

## 2021-11-30 PROCEDURE — 99024 POSTOP FOLLOW-UP VISIT: CPT

## 2021-11-30 NOTE — PROGRESS NOTES
Postop Progress Note    Subjective    Jose Dhillon presents to the office for postop follow up. Chief Complaint   Patient presents with    Post-Op Check     Here for 2 week post-op from [facility], had an appt last week with Rajni Owusu for issues with her incision. Started noticing improvement with her incision after 2 days on the ATB. Feeling much better       Objective    Vitals:    11/30/21 1341   BP: (!) 155/90   Pulse: 80     General: alert, cooperative and no distress  Incision: healing well    Assessment  Doing well postoperatively. Patient had skin reaction to mastisol adhesive - was seen for blistering and redness. This has improved and lap incisions are healing well. Patient denies discharge/spotting but reports that she has felt tired. 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 She will call if she needs work note listing restrictions. Plan  1. Continue any current medications  2. Wound care discussed  3. Pt is to continue with restrictions  4. Usual diet  5.  Follow up: 4 weeks     Electronically signed by Frann Paget, PA-C on 11/30/2021 at 2:01 PM

## 2022-01-18 ENCOUNTER — HOSPITAL ENCOUNTER (OUTPATIENT)
Age: 52
Setting detail: SPECIMEN
Discharge: HOME | End: 2022-01-18

## 2022-01-18 ENCOUNTER — OFFICE VISIT (OUTPATIENT)
Dept: OBGYN CLINIC | Age: 52
End: 2022-01-18

## 2022-01-18 VITALS — DIASTOLIC BLOOD PRESSURE: 74 MMHG | SYSTOLIC BLOOD PRESSURE: 124 MMHG | WEIGHT: 257 LBS | BODY MASS INDEX: 45.53 KG/M2

## 2022-01-18 DIAGNOSIS — N89.8 VAGINAL DISCHARGE: Primary | ICD-10-CM

## 2022-01-18 DIAGNOSIS — Z09 POSTOPERATIVE EXAMINATION: ICD-10-CM

## 2022-01-18 DIAGNOSIS — N89.8 VAGINAL DISCHARGE: ICD-10-CM

## 2022-01-18 LAB
CANDIDA SPECIES, DNA PROBE: POSITIVE
GARDNERELLA VAGINALIS, DNA PROBE: POSITIVE
SOURCE: ABNORMAL
TRICHOMONAS VAGINALIS DNA: NEGATIVE

## 2022-01-18 PROCEDURE — 99024 POSTOP FOLLOW-UP VISIT: CPT

## 2022-01-18 RX ORDER — ZOLPIDEM TARTRATE 10 MG/1
TABLET ORAL
COMMUNITY
Start: 2021-12-15

## 2022-01-18 RX ORDER — PREDNISONE 20 MG/1
20 TABLET ORAL 2 TIMES DAILY
COMMUNITY
End: 2022-06-29

## 2022-01-18 NOTE — PROGRESS NOTES
Interval History: Pt denies any symptoms currently. Discussed plan for treatment and possible disposition.     Review of Systems   Constitutional: Positive for activity change, chills and fever. Negative for appetite change.   HENT: Negative for trouble swallowing.    Eyes: Negative for visual disturbance.   Respiratory: Negative for apnea, cough, choking, chest tightness, shortness of breath, wheezing and stridor.    Cardiovascular: Negative for chest pain, palpitations and leg swelling.   Gastrointestinal: Negative for abdominal pain, constipation, diarrhea, nausea and vomiting.   Genitourinary: Positive for flank pain.   Musculoskeletal: Negative for back pain, neck pain and neck stiffness.   Skin: Negative for color change.   Neurological: Positive for weakness. Negative for dizziness, tremors, seizures, syncope, facial asymmetry, speech difficulty, light-headedness, numbness and headaches.   Psychiatric/Behavioral: Negative for agitation and behavioral problems.     Objective:     Vital Signs (Most Recent):  Temp: 98.5 °F (36.9 °C) (01/11/20 1235)  Pulse: 83 (01/11/20 1235)  Resp: 18 (01/11/20 1235)  BP: 115/62 (01/11/20 1235)  SpO2: 97 % (01/11/20 1235) Vital Signs (24h Range):  Temp:  [98.1 °F (36.7 °C)-99.4 °F (37.4 °C)] 98.5 °F (36.9 °C)  Pulse:  [74-97] 83  Resp:  [15-18] 18  SpO2:  [96 %-100 %] 97 %  BP: (115-138)/(56-79) 115/62     Weight: 77 kg (169 lb 12.1 oz)  Body mass index is 27.4 kg/m².    Intake/Output Summary (Last 24 hours) at 1/11/2020 1426  Last data filed at 1/11/2020 1133  Gross per 24 hour   Intake 1070 ml   Output 1350 ml   Net -280 ml      Physical Exam   Constitutional: She is oriented to person, place, and time. She appears well-developed. No distress.   HENT:   Head: Normocephalic and atraumatic.   Eyes: EOM are normal.   Neck: Normal range of motion. Neck supple.   Cardiovascular: Normal rate, regular rhythm and intact distal pulses.   Pulmonary/Chest: Effort normal and breath sounds  Postop Progress Note    Subjective    Ky Reading presents to the office for postop follow up. Chief Complaint   Patient presents with    Post-Op Check     Here for 6 wk post-op s/p TLH     Objective    Vitals:    01/18/22 0732   BP: 124/74     General: alert, cooperative and no distress  Incision: healing well    Assessment  Doing well postoperatively. Denies spotting/discharge, pain. Reported that she had seeping from one abdominal incision but this has improved and never became infectious. Thick white vaginal discharge on physical exam - Affirm sent. Patient reports she is prone to yeast infections. Plan  1. Continue any current medications  2. Wound care discussed  3. Pt is to increase activities as tolerated  4. Usual diet  5.  Follow up: as needed    Electronically signed by Brook Blackwood PA-C on 1/18/2022 at 7:53 AM normal. No stridor. No respiratory distress. She has no wheezes. She has no rales. She exhibits no tenderness.   Abdominal: Soft. Bowel sounds are normal. She exhibits no distension. There is no tenderness. There is no rebound and no guarding.   Genitourinary:   Genitourinary Comments: Deferred   Musculoskeletal: She exhibits no edema or tenderness.   Neurological: She is alert and oriented to person, place, and time. No sensory deficit.   Skin: Skin is warm and dry. Capillary refill takes less than 2 seconds.   Psychiatric: She has a normal mood and affect. Her behavior is normal. Thought content normal.   Nursing note and vitals reviewed.      Significant Labs:   CBC:   Recent Labs   Lab 01/10/20  2312 01/11/20  0734   WBC 5.84 5.81   HGB 9.6* 10.1*   HCT 30.4* 34.0*   * 392*     CMP:   Recent Labs   Lab 01/10/20  2312      K 3.9      CO2 25   *   BUN 9   CREATININE 0.9   CALCIUM 8.8   PROT 6.5   ALBUMIN 2.6*   BILITOT 0.2   ALKPHOS 64   AST 11   ALT 15   ANIONGAP 9   EGFRNONAA >60     All pertinent labs within the past 24 hours have been reviewed.    Significant Imaging: I have reviewed all pertinent imaging results/findings within the past 24 hours.

## 2022-01-20 DIAGNOSIS — N76.0 BACTERIAL VAGINOSIS: Primary | ICD-10-CM

## 2022-01-20 DIAGNOSIS — B37.9 YEAST INFECTION: ICD-10-CM

## 2022-01-20 DIAGNOSIS — B96.89 BACTERIAL VAGINOSIS: Primary | ICD-10-CM

## 2022-01-20 RX ORDER — FLUCONAZOLE 150 MG/1
150 TABLET ORAL ONCE
Qty: 1 TABLET | Refills: 0 | Status: SHIPPED | OUTPATIENT
Start: 2022-01-20 | End: 2022-01-20

## 2022-01-20 RX ORDER — METRONIDAZOLE 500 MG/1
500 TABLET ORAL 2 TIMES DAILY
Qty: 14 TABLET | Refills: 0 | Status: SHIPPED | OUTPATIENT
Start: 2022-01-20 | End: 2022-01-27

## 2022-06-22 NOTE — PROGRESS NOTES
YEARLY PHYSICAL    Date of service: 2022    Rhett Narayanan  Is a 54 y.o.   female    PT's PCP is: Cesar Sparks MD     : 1970                                         Chaperone for Intimate Exam   Chaperone was offered as part of the rooming process. Patient declined and agrees to continue with exam without a chaperone.  Chaperone: na      Subjective:       Patient's last menstrual period was 11/10/2021 (exact date). Are your menses regular: not applicable    OB History    Para Term  AB Living   2 2           SAB IAB Ectopic Molar Multiple Live Births             2      # Outcome Date GA Lbr Leodan/2nd Weight Sex Delivery Anes PTL Lv   2 Para            1 Para                 Social History     Tobacco Use   Smoking Status Never Smoker   Smokeless Tobacco Never Used        Social History     Substance and Sexual Activity   Alcohol Use Yes    Alcohol/week: 1.0 standard drink    Types: 1 Glasses of wine per week    Comment: occasional       Family History   Problem Relation Age of Onset    Lung Cancer Paternal Grandfather     Diabetes Paternal Grandfather     Diabetes Paternal Grandmother     Hypertension Father        Any family history of breast or ovarian cancer: No    Any family history of blood clots: No      Allergies: Patient has no known allergies.       Current Outpatient Medications:     HUMIRA PEN 40 MG/0.4ML PNKT, , Disp: , Rfl:     zolpidem (AMBIEN) 10 MG tablet, , Disp: , Rfl:     Social History     Substance and Sexual Activity   Sexual Activity Not Currently       Any bleeding or pain with intercourse: No    Last Yearly:  10-    Last pap: 2016    Last HPV: 2016    Last Mammogram: due    Do you do self breast exams: Yes    Past Medical History:   Diagnosis Date    Abnormal Pap smear of cervix     ,     Hyperlipidemia     Hypertension     Liver disease     Stage 3 cirrhosis of liver, spelenomegaly, hx sarcoidosis    Migraines     Polycystic ovaries     Sarcoidosis     was found in uterus during hysterectomy. It is now in right eye. Past Surgical History:   Procedure Laterality Date    CHOLECYSTECTOMY  2010    HERNIA REPAIR  2019    w/ mesh implant    HYSTERECTOMY (CERVIX STATUS UNKNOWN)  11/15/2021    HYSTERECTOMY VAGINAL LAPAROSCOPIC ROBOTIC ASSISTED, B/L salpingectomy    HYSTERECTOMY, VAGINAL N/A 11/15/2021    HYSTERECTOMY VAGINAL LAPAROSCOPIC ROBOTIC ASSISTED, B/L salpingectomy performed by Jair Munoz DO at [place] [address]  2016       Family History   Problem Relation Age of Onset    Lung Cancer Paternal Grandfather     Diabetes Paternal Grandfather     Diabetes Paternal Grandmother     Hypertension Father        Chief Complaint   Patient presents with    Annual Exam     Pt. denies concerns. Pt. reports that sarcoidosis was found in her uterus when she had her hysterectomy. Now she has sarcoidosis in her right eye. PE:  Vital Signs  Blood pressure 110/74, height 5' 4\" (1.626 m), weight 248 lb (112.5 kg), last menstrual period 11/10/2021, not currently breastfeeding. Estimated body mass index is 42.57 kg/m² as calculated from the following:    Height as of this encounter: 5' 4\" (1.626 m). Weight as of this encounter: 248 lb (112.5 kg). Labs:    No results found for this visit on 06/29/22. No data recorded    NURSE: Courtenay Bamberger    HPI: here for annual annual exam    Review of Systems   Constitutional: Positive for fatigue. Negative for chills and fever. Genitourinary: Negative for dysuria, pelvic pain and vaginal discharge. Physical Exam  Constitutional:       General: She is not in acute distress. Appearance: Normal appearance. She is not ill-appearing. Genitourinary:      Vulva normal.      Right Labia: No rash or lesions. Left Labia: No lesions or rash. No vaginal discharge.       No vaginal prolapse present. No vaginal atrophy present. Right Adnexa: not tender. Left Adnexa: not tender. Cervix is absent. Uterus is not tender. Uterus is absent. Breasts:      Right: No mass, nipple discharge, skin change or tenderness. Left: No mass, nipple discharge, skin change or tenderness. HENT:      Head: Normocephalic. Cardiovascular:      Rate and Rhythm: Normal rate and regular rhythm. Pulmonary:      Effort: Pulmonary effort is normal.      Breath sounds: Normal breath sounds. Abdominal:      Palpations: Abdomen is soft. Tenderness: There is no abdominal tenderness. There is no guarding or rebound. Musculoskeletal:         General: Normal range of motion. Neurological:      General: No focal deficit present. Mental Status: She is alert. Psychiatric:         Mood and Affect: Mood normal.         Behavior: Behavior normal.                                   Assessment and Plan          Diagnosis Orders   1. Women's annual routine gynecological examination         Repeat Annual every 1 year  Cervical Cytology Evaluation begins at 24years old. If Negative Cytology, Follow-up screening per current guidelines. Mammograms every 1year. If 37 yo and last mammogram was negative. Routine healthmaintenance per patients PCP. I am having Yordan Bhatia maintain her zolpidem and Humira Pen. Return in about 1 year (around 6/29/2023) for annual.    She was also counseled on her preventative health maintenance recommendations and follow-up. There are no Patient Instructions on file for this visit.     HOMER Roach,7/33/5465 11:57 AM

## 2022-06-29 ENCOUNTER — HOSPITAL ENCOUNTER (OUTPATIENT)
Age: 52
Setting detail: SPECIMEN
Discharge: HOME | End: 2022-06-29

## 2022-06-29 ENCOUNTER — OFFICE VISIT (OUTPATIENT)
Dept: OBGYN CLINIC | Age: 52
End: 2022-06-29
Payer: COMMERCIAL

## 2022-06-29 VITALS
SYSTOLIC BLOOD PRESSURE: 110 MMHG | HEIGHT: 64 IN | BODY MASS INDEX: 42.34 KG/M2 | DIASTOLIC BLOOD PRESSURE: 74 MMHG | WEIGHT: 248 LBS

## 2022-06-29 DIAGNOSIS — Z01.419 WOMEN'S ANNUAL ROUTINE GYNECOLOGICAL EXAMINATION: Primary | ICD-10-CM

## 2022-06-29 PROCEDURE — 99396 PREV VISIT EST AGE 40-64: CPT | Performed by: OBSTETRICS & GYNECOLOGY

## 2022-06-29 RX ORDER — ADALIMUMAB 40MG/0.4ML
KIT SUBCUTANEOUS
COMMUNITY
Start: 2022-05-31

## 2022-07-14 LAB — CYTOLOGY REPORT: NORMAL

## 2022-07-19 ENCOUNTER — TELEPHONE (OUTPATIENT)
Dept: OBGYN CLINIC | Age: 52
End: 2022-07-19

## 2022-07-19 RX ORDER — FLUCONAZOLE 150 MG/1
150 TABLET ORAL DAILY
Qty: 2 TABLET | Refills: 0 | Status: SHIPPED | OUTPATIENT
Start: 2022-07-19

## 2022-07-19 RX ORDER — FLUCONAZOLE 150 MG/1
150 TABLET ORAL ONCE
Qty: 1 TABLET | Refills: 0 | Status: SHIPPED
Start: 2022-07-19 | End: 2022-07-19 | Stop reason: SDUPTHER

## 2022-07-19 RX ORDER — METRONIDAZOLE 500 MG/1
500 TABLET ORAL 2 TIMES DAILY
Qty: 14 TABLET | Refills: 0 | Status: SHIPPED | OUTPATIENT
Start: 2022-07-19 | End: 2022-07-26

## 2022-07-19 NOTE — TELEPHONE ENCOUNTER
I attempted to call patient with her results and had to leave a message. Left a detailed message with patient's results and recommendations. Rxs e-prescribed to [facility] on [address]  Patient to call with any further questions/concerns.

## 2022-07-19 NOTE — TELEPHONE ENCOUNTER
----- Message from Sanjuanita Gaming DO sent at 7/18/2022  2:15 PM EDT -----  Yeast and bv noted on pap - treat with flagyl or metrogel and diflucan #2 TIW; pap normal

## 2023-08-02 ENCOUNTER — OFFICE VISIT (OUTPATIENT)
Dept: OBGYN CLINIC | Age: 53
End: 2023-08-02
Payer: COMMERCIAL

## 2023-08-02 VITALS
WEIGHT: 224 LBS | HEIGHT: 63 IN | SYSTOLIC BLOOD PRESSURE: 108 MMHG | DIASTOLIC BLOOD PRESSURE: 86 MMHG | BODY MASS INDEX: 39.69 KG/M2

## 2023-08-02 DIAGNOSIS — Z01.419 WOMEN'S ANNUAL ROUTINE GYNECOLOGICAL EXAMINATION: Primary | ICD-10-CM

## 2023-08-02 PROCEDURE — 99396 PREV VISIT EST AGE 40-64: CPT | Performed by: OBSTETRICS & GYNECOLOGY

## 2023-08-02 ASSESSMENT — ENCOUNTER SYMPTOMS
DIARRHEA: 0
CONSTIPATION: 0
ABDOMINAL PAIN: 0
SHORTNESS OF BREATH: 0

## 2023-08-02 NOTE — PROGRESS NOTES
Follow-up screening per current guidelines. Mammograms every 1year. If 35 yo and last mammogram was negative. Routine healthmaintenance per patients PCP.     Return in about 1 year (around 8/2/2024) for annual.     Electronically Signed by Kojo Reyes DO

## 2024-08-07 ENCOUNTER — OFFICE VISIT (OUTPATIENT)
Dept: OBGYN CLINIC | Age: 54
End: 2024-08-07
Payer: COMMERCIAL

## 2024-08-07 VITALS
WEIGHT: 235 LBS | BODY MASS INDEX: 41.64 KG/M2 | HEIGHT: 63 IN | DIASTOLIC BLOOD PRESSURE: 76 MMHG | SYSTOLIC BLOOD PRESSURE: 112 MMHG

## 2024-08-07 DIAGNOSIS — Z01.419 WOMEN'S ANNUAL ROUTINE GYNECOLOGICAL EXAMINATION: Primary | ICD-10-CM

## 2024-08-07 PROCEDURE — 99396 PREV VISIT EST AGE 40-64: CPT | Performed by: OBSTETRICS & GYNECOLOGY

## 2024-08-07 ASSESSMENT — ENCOUNTER SYMPTOMS
CONSTIPATION: 0
ABDOMINAL PAIN: 0
DIARRHEA: 0
SHORTNESS OF BREATH: 0

## 2024-08-07 NOTE — PROGRESS NOTES
YEARLY PHYSICAL    Date of service: 2024    Melva Pina  Is a 54 y.o. female    PT's PCP is: Leyla Saleh MD     : 1970                                         Chaperone for Intimate Exam  Chaperone was offered as part of the rooming process. Patient declined and agrees to continue with exam without a chaperone.  Chaperone: N/A      Subjective:       Patient's last menstrual period was 10/11/2021.     Are your menses regular: not applicable    OB History    Para Term  AB Living   2 2           SAB IAB Ectopic Molar Multiple Live Births             2      # Outcome Date GA Lbr Leodan/2nd Weight Sex Delivery Anes PTL Lv   2 Para            1 Para                 Social History     Tobacco Use   Smoking Status Never   Smokeless Tobacco Never        Social History     Substance and Sexual Activity   Alcohol Use Yes    Alcohol/week: 1.0 standard drink of alcohol    Types: 1 Glasses of wine per week    Comment: occasional       Family History   Problem Relation Age of Onset    Lung Cancer Paternal Grandfather     Diabetes Paternal Grandfather     Diabetes Paternal Grandmother     Hypertension Father        Any family history of breast or ovarian cancer: No    Any family history of blood clots: No      Allergies: Patient has no known allergies.      Current Outpatient Medications:     inFLIXimab (REMICADE IV), Infuse intravenously, Disp: , Rfl:     HUMIRA PEN 40 MG/0.4ML PNKT, , Disp: , Rfl:     zolpidem (AMBIEN) 10 MG tablet, , Disp: , Rfl:     Social History     Substance and Sexual Activity   Sexual Activity Not Currently       Any bleeding or pain with intercourse: No    Last Yearly:  2023    Last pap: 2022 NL    Last HPV: 2022 NEG    Last Mammogram: 2023    Last Dexascan Never    Last colorectal screen- type:colonoscopy  date  years ago     Do you do self breast exams: Yes    Past Medical History:

## 2024-09-19 LAB — MAMMOGRAPHY, EXTERNAL: NORMAL

## 2025-08-08 ENCOUNTER — OFFICE VISIT (OUTPATIENT)
Dept: OBGYN | Age: 55
End: 2025-08-08
Payer: COMMERCIAL

## 2025-08-08 VITALS — BODY MASS INDEX: 39.87 KG/M2 | HEIGHT: 63 IN | WEIGHT: 225 LBS

## 2025-08-08 DIAGNOSIS — Z01.419 WOMEN'S ANNUAL ROUTINE GYNECOLOGICAL EXAMINATION: Primary | ICD-10-CM

## 2025-08-08 PROCEDURE — 99396 PREV VISIT EST AGE 40-64: CPT | Performed by: OBSTETRICS & GYNECOLOGY

## 2025-08-08 RX ORDER — METHOTREXATE 2.5 MG/1
10 TABLET ORAL
COMMUNITY

## 2025-08-08 RX ORDER — FOLIC ACID 1 MG/1
1000 TABLET ORAL DAILY
COMMUNITY

## 2025-08-08 SDOH — ECONOMIC STABILITY: FOOD INSECURITY: WITHIN THE PAST 12 MONTHS, YOU WORRIED THAT YOUR FOOD WOULD RUN OUT BEFORE YOU GOT MONEY TO BUY MORE.: NEVER TRUE

## 2025-08-08 SDOH — ECONOMIC STABILITY: FOOD INSECURITY: WITHIN THE PAST 12 MONTHS, THE FOOD YOU BOUGHT JUST DIDN'T LAST AND YOU DIDN'T HAVE MONEY TO GET MORE.: NEVER TRUE

## 2025-08-08 ASSESSMENT — PATIENT HEALTH QUESTIONNAIRE - PHQ9
SUM OF ALL RESPONSES TO PHQ QUESTIONS 1-9: 0
2. FEELING DOWN, DEPRESSED OR HOPELESS: NOT AT ALL
1. LITTLE INTEREST OR PLEASURE IN DOING THINGS: NOT AT ALL
SUM OF ALL RESPONSES TO PHQ QUESTIONS 1-9: 0

## 2025-08-08 ASSESSMENT — ENCOUNTER SYMPTOMS
CONSTIPATION: 0
SHORTNESS OF BREATH: 0
ABDOMINAL PAIN: 0
DIARRHEA: 0

## (undated) DEVICE — Z DISCONTINUED APPLICATOR SURG PREP 0.35OZ 2% CHG 70% ISO ALC W/ HI LT

## (undated) DEVICE — TOTAL TRAY, DB, 100% SILI FOLEY, 16FR 10: Brand: MEDLINE

## (undated) DEVICE — CANNULA SEAL

## (undated) DEVICE — BLADELESS OBTURATOR, LONG: Brand: WECK VISTA

## (undated) DEVICE — Z DISCONTINUED USE 2272114 DRAPE SURG UTIL 26X15 IN W/ TAPE N INVASIVE MULTLYR DISP

## (undated) DEVICE — MASTISOL ADHESIVE LIQ 2/3ML

## (undated) DEVICE — BLADELESS OBTURATOR: Brand: WECK VISTA

## (undated) DEVICE — SOLUTION IV 1000ML 0.9% SOD CHL FOR IRRIG PLAS CONT

## (undated) DEVICE — UNDERPANTS INCONT XL 45-70IN KNIT SEAMLESS DSGN COLOR-CODED

## (undated) DEVICE — [HIGH FLOW INSUFFLATOR,  DO NOT USE IF PACKAGE IS DAMAGED,  KEEP DRY,  KEEP AWAY FROM SUNLIGHT,  PROTECT FROM HEAT AND RADIOACTIVE SOURCES.]: Brand: PNEUMOSURE

## (undated) DEVICE — INSUFFLATION NEEDLE TO ESTABLISH PNEUMOPERITONEUM.: Brand: INSUFFLATION NEEDLE

## (undated) DEVICE — SOLUTION IV IRRIG POUR BRL 0.9% SODIUM CHL 2F7124

## (undated) DEVICE — ELECTRO LUBE IS A SINGLE PATIENT USE DEVICE THAT IS INTENDED TO BE USED ON ELECTROSURGICAL ELECTRODES TO REDUCE STICKING.: Brand: KEY SURGICAL ELECTRO LUBE

## (undated) DEVICE — SPONGE LAP W18XL18IN WHT COT 4 PLY FLD STRUNG RADPQ DISP ST

## (undated) DEVICE — COVER LT HNDL BLU PLAS

## (undated) DEVICE — Z DISCONTINUED BY MEDLINE USE 2711682 TRAY SKIN PREP DRY W/ PREM GLV

## (undated) DEVICE — MATERNITY KNIT PANTS,SEAMLESS: Brand: WINGS

## (undated) DEVICE — ARM DRAPE

## (undated) DEVICE — ELECTRODE ES AD CRD L15FT DISP FOR PT BELOW 30LB REM

## (undated) DEVICE — GLOVE SURG SZ 65 L12IN FNGR THK87MIL WHT LTX FREE

## (undated) DEVICE — TIP COVER ACCESSORY

## (undated) DEVICE — SYRINGE MED 10ML LUERLOCK TIP W/O SFTY DISP

## (undated) DEVICE — DRESSING HEMSTAT W1X2IN ABSRB SURGCEL SNOW

## (undated) DEVICE — GOWN,SIRUS,POLYRNF,BRTHSLV,XL,30/CS: Brand: MEDLINE

## (undated) DEVICE — SUTURE DEV SZ 2-0 WND CLSR ABSRB GS-22 VLOC COVIDIEN VLOCM2145

## (undated) DEVICE — COVER,MAYO STAND,STERILE: Brand: MEDLINE

## (undated) DEVICE — APPLICATOR MEDICATED 26 CC SOLUTION HI LT ORNG CHLORAPREP

## (undated) DEVICE — STRIP,CLOSURE,WOUND,MEDI-STRIP,1/2X4: Brand: MEDLINE

## (undated) DEVICE — 40586 ADVANCED TRENDELENBURG POSITIONING KIT: Brand: 40586 ADVANCED TRENDELENBURG POSITIONING KIT

## (undated) DEVICE — DRESSING SURESITE-123PLUSPAD 2.4 IN X2.8 IN

## (undated) DEVICE — 20 ML SYRINGE LUER-LOCK TIP: Brand: MONOJECT

## (undated) DEVICE — WARMER SCP 2 ANTIFOG LAP DISP

## (undated) DEVICE — SUTURE MCRYL SZ 4-0 L27IN ABSRB UD L19MM PS-2 1/2 CIR PRIM Y426H

## (undated) DEVICE — SOLUTION SURG PREP ANTIMICROBIAL 4 OZ SKIN WND EXIDINE

## (undated) DEVICE — LAVH-LF: Brand: MEDLINE INDUSTRIES, INC.

## (undated) DEVICE — Device